# Patient Record
Sex: FEMALE | Race: WHITE
[De-identification: names, ages, dates, MRNs, and addresses within clinical notes are randomized per-mention and may not be internally consistent; named-entity substitution may affect disease eponyms.]

---

## 2018-03-19 ENCOUNTER — HOSPITAL ENCOUNTER (EMERGENCY)
Dept: HOSPITAL 62 - ER | Age: 28
Discharge: HOME | End: 2018-03-19
Payer: MEDICAID

## 2018-03-19 VITALS — DIASTOLIC BLOOD PRESSURE: 66 MMHG | SYSTOLIC BLOOD PRESSURE: 108 MMHG

## 2018-03-19 DIAGNOSIS — Z87.442: ICD-10-CM

## 2018-03-19 DIAGNOSIS — R53.81: ICD-10-CM

## 2018-03-19 DIAGNOSIS — R05: ICD-10-CM

## 2018-03-19 DIAGNOSIS — F17.200: ICD-10-CM

## 2018-03-19 DIAGNOSIS — R53.83: ICD-10-CM

## 2018-03-19 DIAGNOSIS — R68.83: ICD-10-CM

## 2018-03-19 DIAGNOSIS — R11.0: ICD-10-CM

## 2018-03-19 DIAGNOSIS — Z87.19: ICD-10-CM

## 2018-03-19 DIAGNOSIS — R10.9: ICD-10-CM

## 2018-03-19 DIAGNOSIS — B34.9: Primary | ICD-10-CM

## 2018-03-19 LAB
APPEARANCE UR: (no result)
APTT PPP: (no result) S
BILIRUB UR QL STRIP: NEGATIVE
GLUCOSE UR STRIP-MCNC: NEGATIVE MG/DL
KETONES UR STRIP-MCNC: NEGATIVE MG/DL
NITRITE UR QL STRIP: NEGATIVE
PH UR STRIP: 5 [PH] (ref 5–9)
PROT UR STRIP-MCNC: NEGATIVE MG/DL
SP GR UR STRIP: 1.03
UROBILINOGEN UR-MCNC: NEGATIVE MG/DL (ref ?–2)

## 2018-03-19 PROCEDURE — 81025 URINE PREGNANCY TEST: CPT

## 2018-03-19 PROCEDURE — 81001 URINALYSIS AUTO W/SCOPE: CPT

## 2018-03-19 PROCEDURE — 71046 X-RAY EXAM CHEST 2 VIEWS: CPT

## 2018-03-19 PROCEDURE — 99285 EMERGENCY DEPT VISIT HI MDM: CPT

## 2018-03-19 NOTE — RADIOLOGY REPORT (SQ)
EXAM DESCRIPTION:  CHEST PA/LAT



COMPLETED DATE/TIME:  3/19/2018 11:12 am



REASON FOR STUDY:  cough/cp



COMPARISON:  3/1/2016



EXAM PARAMETERS:  NUMBER OF VIEWS: two views

TECHNIQUE: Digital Frontal and Lateral radiographic views of the chest acquired.

RADIATION DOSE: NA

LIMITATIONS: none



FINDINGS:  LUNGS AND PLEURA: No opacities, masses or pneumothorax. No pleural effusion.

MEDIASTINUM AND HILAR STRUCTURES: No masses or contour abnormalities.

HEART AND VASCULAR STRUCTURES: Heart normal size.  No evidence for failure.

BONES: No acute findings.

HARDWARE: None in the chest.

OTHER: No other significant finding.



IMPRESSION:  NO SIGNIFICANT RADIOGRAPHIC FINDING IN THE CHEST.



TECHNICAL DOCUMENTATION:  JOB ID:  [id number]

 2011 [employer]- All Rights Reserved



Reading location - IP/workstation name: [email or web address]

## 2018-03-19 NOTE — ER DOCUMENT REPORT
ED General





- General


Chief Complaint: Breathing Difficulty


Stated Complaint: FEVER/COUGH


Time Seen by Provider: 18 10:51


Mode of Arrival: Ambulatory


Information source: Patient


Notes: 





Patient reports cough congestion and left flank pain.  The pain does radiate 

down the left side.  It is worse with movement or coughing.  It is mild to 

moderate.  Is intermittent.  She denies any problems with urination.  No 

vaginal bleeding or discharge.  Some nausea but no vomiting.  She states she is 

getting tired when she exerts herself.  Symptoms are worse with exertion.


TRAVEL OUTSIDE OF THE [place] IN LAST 30 DAYS: No





- Related Data


Allergies/Adverse Reactions: 


 





No Known Allergies Allergy (Verified 18 10:18)


 











Past Medical History





- General


Information source: Patient





- Social History


Smoking Status: Current Every Day Smoker


Chew tobacco use (# tins/day): No


Frequency of alcohol use: Occasional


Drug Abuse: None


Family History: Reviewed & Not Pertinent


Patient has suicidal ideation: No


Patient has homicidal ideation: No


Neurological Medical History: Denies: Hx Seizures


Renal/ Medical History: Denies: Hx Kidney Stones, Hx Peritoneal Dialysis


GI Medical History: Denies: Hx Gastroesophageal Reflux Disease


Past Surgical History: Reports: Hx  Section.  Denies: Hx Hysterectomy, 

Hx Pacemaker





- Immunizations


Hx Diphtheria, Pertussis, Tetanus Vaccination: Yes





Review of Systems





- Review of Systems


Constitutional: Chills, Malaise


Cardiovascular: Chest pain.  denies: Palpitations


Respiratory: Cough.  denies: Sputum


-: Yes All other systems reviewed and negative





Physical Exam





- Vital signs


Vitals: 





 











Temp Pulse Resp BP Pulse Ox


 


 99.2 F   93   16   122/83   100 


 


 18 10:23  18 10:23  18 10:23  18 10:23  18 10:23











Interpretation: Normal





- General


General appearance: Appears well, Alert





- HEENT


Head: Normocephalic, Atraumatic


Eyes: Normal


Pupils: PERRL





- Respiratory


Respiratory status: No respiratory distress


Chest status: Nontender


Breath sounds: Normal


Chest palpation: Normal





- Cardiovascular


Rhythm: Regular


Heart sounds: Normal auscultation


Murmur: No





- Abdominal


Inspection: Normal


Distension: No distension


Bowel sounds: Normal


Tenderness: Nontender


Organomegaly: No organomegaly





- Back


Back: Normal, Nontender





- Extremities


General upper extremity: Normal inspection, Nontender, Normal color, Normal ROM

, Normal temperature


General lower extremity: Normal inspection, Nontender, Normal color, Normal ROM

, Normal temperature, Normal weight bearing.  No: Maxwell's sign





- Neurological


Neuro grossly intact: Yes


Cognition: Normal


Orientation: AAOx4


[place]ista Coma Scale Eye Opening: Spontaneous


Vista Coma Scale Verbal: Oriented


Margaux Coma Scale Motor: Obeys Commands


Vista Coma Scale Total: 15


Speech: Normal


Motor strength normal: LUE, RUE, LLE, RLE


Sensory: Normal





- Psychological


Associated symptoms: Normal affect, Normal mood





- Skin


Skin Temperature: Warm


Skin Moisture: Dry


Skin Color: Normal





Course





- Vital Signs


Vital signs: 





 











Temp Pulse Resp BP Pulse Ox


 


 99.2 F   93   16   122/83   100 


 


 18 10:23  18 10:23  18 10:23  18 10:23  18 10:23














- Diagnostic Test


Radiology reviewed: Image reviewed, Reports reviewed - Patient's chest x-ray 

showed no evidence of infiltrate or edema.





Discharge





- Discharge


Clinical Impression: 


 Viral syndrome





Condition: Stable


Disposition: HOME, SELF-CARE


Instructions:  Viral Syndrome ([facility])


Additional Instructions: 


Please call your primary doctor as soon as possible to arrange follow-up


Prescriptions: 


Tramadol HCl [Ultram 50 mg Tablet] 50 mg PO Q4HP PRN #10 tab


 PRN Reason: 


Forms:  Return to Work

## 2018-05-18 ENCOUNTER — HOSPITAL ENCOUNTER (EMERGENCY)
Dept: HOSPITAL 62 - ER | Age: 28
Discharge: HOME | End: 2018-05-18
Payer: MEDICAID

## 2018-05-18 VITALS — SYSTOLIC BLOOD PRESSURE: 124 MMHG | DIASTOLIC BLOOD PRESSURE: 71 MMHG

## 2018-05-18 DIAGNOSIS — L03.317: Primary | ICD-10-CM

## 2018-05-18 DIAGNOSIS — F17.210: ICD-10-CM

## 2018-05-18 PROCEDURE — 99406 BEHAV CHNG SMOKING 3-10 MIN: CPT

## 2018-05-18 PROCEDURE — 99281 EMR DPT VST MAYX REQ PHY/QHP: CPT

## 2018-05-18 NOTE — ER DOCUMENT REPORT
ED Skin Rash/Insect Bite/Abscs





- General


Chief Complaint: Insect Bite


Stated Complaint: POSSIBLE SPIDER BITE


Time Seen by Provider: 18 12:06


Mode of Arrival: Ambulatory


Information source: Patient


Notes: 





27-year-old female presents to ED for complaint of red swollen area to her 

right buttocks started 3 days ago.  She states it felt like a insect bite 3 

days ago and she scratched it.  She states it was itchy and tender but it is 

started getting bigger since she has scratched it.  She states that sometimes 

she has pain down her right leg from this area.  Patient is alert and oriented 

pupils equal react to light, respirations are regular and unlabored, she does 

speak with a full sentences.  She does walk with a even steady gait.  And 

patient is in no acute distress.


TRAVEL OUTSIDE OF THE [place] IN LAST 30 DAYS: No





- HPI


Patient complains to provider of: Tender/swollen area


Onset: Other - 3 days


Onset/Duration: Gradual, Worse


Quality of pain: Burning - Itching


Severity: Moderate


Pain Level: 3


Skin Character: Erythema - Cellulitis to the right buttocks


Quality of rash: Itchy, Painful


Exacerbated by: Sitting


Relieved by: Denies


Similar symptoms previously: No


Recently seen / treated by doctor: No





- Related Data


Allergies/Adverse Reactions: 


 





No Known Allergies Allergy (Verified 18 10:57)


 











Past Medical History





- General


Information source: Patient





- Social History


Smoking Status: Current Every Day Smoker


Cigarette use (# per day): Yes - Pack per day


Chew tobacco use (# tins/day): No


Smoking Education Provided: Yes - 4 minutes


Frequency of alcohol use: Social


Drug Abuse: None


Family History: Reviewed & Not Pertinent


Patient has suicidal ideation: No


Patient has homicidal ideation: No





- Past Medical History


Cardiac Medical History: Reports: None


Pulmonary Medical History: Reports: None


EENT Medical History: Reports: None


Neurological Medical History: Reports: None


Renal/ Medical History: Reports: None


Malignancy Medical History: Reports: None


GI Medical History: Reports: None


Musculoskeltal Medical History: Reports Hx Musculoskeletal Trauma


Skin Medical History: Reports None


Psychiatric Medical History: Reports: None


Traumatic Medical History: Reports: Hx Fractures - Right face finger almost 

detached


Infectious Medical History: Reports: None


Past Surgical History: Reports: Hx  Section - 3, Hx Orthopedic Surgery 

- Right fifth finger reattached





- Immunizations


Hx Diphtheria, Pertussis, Tetanus Vaccination: Yes





Review of Systems





- Review of Systems


Constitutional: No symptoms reported


EENT: No symptoms reported


Cardiovascular: No symptoms reported


Respiratory: No symptoms reported


Gastrointestinal: No symptoms reported


Genitourinary: No symptoms reported


Female Genitourinary: No symptoms reported


Musculoskeletal: No symptoms reported


Skin: Change in color - Erythematous swollen painful area to the right buttocks 

no fluctuation noted


Hematologic/Lymphatic: No symptoms reported


Neurological/Psychological: No symptoms reported





Physical Exam





- Vital signs


Vitals: 


 











Temp Pulse Resp BP Pulse Ox


 


 98.8 F   89   16   124/71   98 


 


 18 11:00  18 11:00  18 11:00  18 11:00  18 11:00











Interpretation: Normal





- General


General appearance: Appears well, Alert





- HEENT


Head: Normocephalic, Atraumatic


Eyes: Normal


Pupils: PERRL





- Respiratory


Respiratory status: No respiratory distress


Chest status: Nontender


Breath sounds: Normal


Chest palpation: Normal





- Cardiovascular


Rhythm: Regular


Heart sounds: Normal auscultation


Murmur: No





- Abdominal


Inspection: Normal


Distension: No distension


Bowel sounds: Normal


Tenderness: Nontender


Organomegaly: No organomegaly





- Back


Back: Normal, Nontender





- Extremities


General upper extremity: Normal inspection, Nontender, Normal color, Normal ROM

, Normal temperature


General lower extremity: Normal inspection, Nontender, Normal color, Normal ROM

, Normal temperature, Normal weight bearing.  No: Maxwell's sign





- Neurological


Neuro grossly intact: Yes


Cognition: Normal


Orientation: AAOx4


Margaux Coma Scale Eye Opening: Spontaneous


[place] Coma Scale Verbal: Oriented


[place] Coma Scale Motor: Obeys Commands


Margaux Coma Scale Total: 15


Speech: Normal


Motor strength normal: LUE, RUE, LLE, RLE


Sensory: Normal





- Psychological


Associated symptoms: Normal affect, Normal mood





- Skin


Skin Temperature: Warm


Skin Moisture: Dry


Skin Color: Normal


Skin irregularity: Abscess - Right buttocks


Location of irregularity: Other - Right buttocks


Character of irregularity: Erythematous


Irregularity with: Swelling, Tenderness, Warmth - No fluctuance noted





Course





- Re-evaluation


Re-evalutation: 





18 12:27


Patient given instructions for use of Keflex and Bactrim in warm Epsom salts 

soaks for this cellulitis that looks like it could form into an abscess.  There 

is no fluctuance at this time.  Patient was instructed to return to the ED or 

primary doctor if there is  any increase in redness swelling or pain.  The area 

was marked so she will know if there is any increase.  Patient and boyfriend 

verbalized understanding of instructions and agreement with treatment plan.  

Patient was discharged home with prescriptions for ibuprofen Bactrim and Keflex.





- Vital Signs


Vital signs: 


 











Temp Pulse Resp BP Pulse Ox


 


 98.8 F   89   16   124/71   98 


 


 18 11:00  18 11:00  18 11:00  18 11:00  18 11:00














Discharge





- Discharge


Clinical Impression: 


 Cellulitis of right buttock





Condition: Stable


Disposition: HOME, SELF-CARE


Instructions:  Family Physicians / Practices


Additional Instructions: 


CELLULITIS:





     You have an infection of your skin and underlying soft tissues called 

cellulitis.  This is due to bacteria, which can enter through any break in the 

skin, or even through an irritated hair follicle.  Untreated, cellulitis will 

usually worsen.


     Antibiotics are required.  Usually, warm packs or warm soaks, and 

elevation of the infected area are recommended.  You should start getting 

better within 24 to 36 hours.


     Most infections respond quickly to the right medication. Follow-up care is 

important, however, to check for abscess (boil) formation, unsuspected foreign 

body, or resistant infection.


     If you develop fever, chills, or if the area of infection is becoming 

rapidly more swollen or painful, call the doctor at once.








TRIMETHOPRIM-SULFA:


     You have been given a prescription for trimethoprim-sulfa (TMS, Septra, 

Bactrim).  This is a combination antibiotic of the sulfa class, often used for 

urinary tract infections, middle ear infections, bronchitis, shigella 

intestinal infection, and Pneumocystis pneumonia.


     TMS is usually well-tolerated.  Occasional side effects include nausea and 

decreased appetite.  Septra is not recommended for infants less than two months 

of age.  Do not take this medication if you have experienced severe side 

effects or allergy to sulfa medicine.


     You should stop this medicine at once and contact your physician if you 

develop any rash, joint pain, shortness of breath, bruising, or jaundice (

yellow color in the skin), or if you develop any other new or unusual symptoms.





Cephalexin





     The antibiotic you've been prescribed is a member of the cephalosporin 

class.  This type of antibiotic covers a wide variety of infections, including 

those of the skin, lungs, and urinary tract. It's useful for staph infections.


     This antibiotic is slightly similar to the penicillin family. In rare cases

, a person who is allergic to penicillin will also be allergic to this 

medication.  If you have had a severe allergic reaction to penicillin, and have 

not taken this antibiotic since that time, notify your doctor.


     Antibiotics which cover many germs ("broad spectrum" antibiotics) are more 

likely to cause diarrhea or "yeast" infections.  Women prone to vaginal yeast 

problems may suffer an attack after taking this antibiotic.  In infants, oral 

thrush (white spots "stuck" on the cheek) or yeast diaper rash may result.  See 

your doctor if these problems occur.  Call at once if you develop itching, hives

, shortness of breath, or lightheadedness.





Epsom Salt Soaks





     Soak the wound area in a container of warm epsom salt water.  If you can't 

get the wound area into a bucket or pan, use a folded towel soaked in the epsom 

salt solution and apply to the area.


     Use clean hot tap water (about the temperature of a very warm bath), 

mixing in about one (1) teaspoon for every pint of water.


           Two gallon --> 16 teaspoons Epsom Salts


           One gallon -->  8 teaspoons Epsom Salts


           Two quarts -->  4 teaspoons Epsom Salts


           One quart  -->  2 teaspoons Epsom Salts


     Soak the wound for about 20 minutes while gently moving it around in the 

water.  Repeat this four (4) times a day.





Acetaminophen





     Acetaminophen may be taken for pain relief or fever control. It's much 

safer than aspirin, offering a wider range of "safe" dosages.  It is safe 

during pregnancy.  Some brand names are Tylenol, Panadol, Datril, Anacin 3, 

Tempra, and Liquiprin. Acetaminophen can be repeated every four hours.  The 

following are maximum recommended dosages:





WEIGHT         Dose             Drops                  Elixir        Chewable(

80mg)


(LBS.)                            drprs=droppers    tsp=teaspoon


6                 40 mg            .4 ml (1/2)


6-11            80 mg            .8 ml (full)            1/2   tsp           1 

      tab


12-16         120 mg           1 1/2 drprs            3/4   tsp           1 1/2

  tabs


17-23         160 mg             2  drprs              1      tsp           2  

     tabs


24-30         240 mg             3  drprs              1 1/2 tsp           3   

    tabs


30-35         320 mg                                     2       tsp           

4       tabs


36-41         360 mg                                     2 1/4 tsp           4 1

/2  tabs


42-47         400 mg                                     2 1/2 tsp           5 

      tabs


48-53         480 mg                                     3       tsp          6

       tabs


54-59         520 mg                                     3  1/4 tsp          6 1

/2 tabs


60-64         560 mg                                     3  1/2 tsp          7 

     tabs 


65-70         600 mg                                     3  3/4 tsp          7 1

/2 tabs


71-76         640 mg                                     4       tsp           

8      tabs


77-82         720 mg                                     4 1/2  tsp           9

      tabs


83-88         800 mg                                     5       tsp           

10     tabs





>89 pounds or adults          650 mg to 900 mg 





Acetaminophen can be repeated every four hours. Maximum daily dose not to 

exceed 4000 mg.





   These maximum recommended dosages are slightly higher than the dosages 

written on the product container, but these dosages are very safe and well 

below the toxic dosage for acetaminophen.





Anti-Inflammatory Medication





     You have received a prescription for an antiinflammatory agent. This is an 

excellent, safe drug for pain control.  In addition, it has potent 

antiinflammatory effects which are beneficial, especially in the treatment of 

injuries, arthritis, or tendonitis.


     It's best to take this medicine with food.  Persons with ulcer disease or 

allergy to aspirin should notify their physician of this before taking this 

drug.


     Take the medication exactly as prescribed.  Don't take additional doses 

unless instructed to do so by your doctor.  If you develop wheezing, shortness 

of breath, hives, faintness, stomach pain, vomiting, or dark black stools, 

return for re-evaluation at once.





FOLLOW-UP CARE:


If you have been referred to a physician for follow-up care, call the physician

s office for an appointment as you were instructed or within the next two days.

  If you experience worsening or a significant change in your symptoms, notify 

the physician immediately or return to the Emergency Department at any time for 

re-evaluation.


Prescriptions: 


Ibuprofen 600 mg PO Q8HP PRN #20 tablet


 PRN Reason: 


Cephalexin Monohydrate [Keflex 500 mg Capsule] 500 mg PO Q6H 5 Days  capsule


Sulfamethoxazole/Trimethoprim [Septra-Ds 800-160 mg Tablet] 1 tab PO BID #20 

tablet


Forms:  Smoking Cessation Education, Return to Work

## 2019-01-10 ENCOUNTER — HOSPITAL ENCOUNTER (EMERGENCY)
Dept: HOSPITAL 62 - ER | Age: 29
Discharge: HOME | End: 2019-01-10
Payer: COMMERCIAL

## 2019-01-10 VITALS — DIASTOLIC BLOOD PRESSURE: 66 MMHG | SYSTOLIC BLOOD PRESSURE: 111 MMHG

## 2019-01-10 DIAGNOSIS — M79.672: ICD-10-CM

## 2019-01-10 DIAGNOSIS — F17.200: ICD-10-CM

## 2019-01-10 DIAGNOSIS — M79.642: ICD-10-CM

## 2019-01-10 DIAGNOSIS — S00.81XA: ICD-10-CM

## 2019-01-10 DIAGNOSIS — S09.90XA: ICD-10-CM
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
## 2019-01-10 DIAGNOSIS — M25.572: ICD-10-CM

## 2019-01-10 DIAGNOSIS — S93.402A: Primary | ICD-10-CM

## 2019-01-10 PROCEDURE — L4350 ANKLE CONTROL ORTHO PRE OTS: HCPCS

## 2019-01-10 PROCEDURE — 99283 EMERGENCY DEPT VISIT LOW MDM: CPT

## 2019-01-10 PROCEDURE — 73610 X-RAY EXAM OF ANKLE: CPT

## 2019-01-10 PROCEDURE — 73630 X-RAY EXAM OF FOOT: CPT

## 2019-01-10 PROCEDURE — L1902 AFO ANKLE GAUNTLET PRE OTS: HCPCS

## 2019-01-10 PROCEDURE — 73130 X-RAY EXAM OF HAND: CPT
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
